# Patient Record
Sex: MALE | Race: WHITE
[De-identification: names, ages, dates, MRNs, and addresses within clinical notes are randomized per-mention and may not be internally consistent; named-entity substitution may affect disease eponyms.]

---

## 2019-09-06 NOTE — NUR
PT ARRIVES TO ED WITH C/O OF RECTAL PAIN FIR A FEW MONTHS. HOWEVER IN THE PAST 
WEEK HE NOTICED IT WAS BLEEDING MORE AND THE PAIN GOT MORE SEVERE. PT DENIES 
ANY TRUAMA TO ANUS. 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 PT HAS NO COMPLEX MEDICAL 
HX. PT HAS NOT SEEN GI YET. PT CONNECTED TO MONITORS AND CALLLIGHT IN REACH 
AWAITINGFURTHER ORDERS. VSS

## 2019-09-06 NOTE — NUR
GO LYTELY STARTED AND PT GIVEN INSTRUCTIONS ON HOW FAST TO DRINK AND WHEN WE 
CAN STOP. PT VERBALZIED UNDERSTANDING. VSS

## 2020-07-31 ENCOUNTER — HOSPITAL ENCOUNTER (OUTPATIENT)
Dept: HOSPITAL 8 - RAD | Age: 50
Discharge: HOME | End: 2020-07-31
Attending: SURGERY
Payer: COMMERCIAL

## 2020-07-31 DIAGNOSIS — K57.30: Primary | ICD-10-CM

## 2020-07-31 DIAGNOSIS — Z93.2: ICD-10-CM

## 2020-07-31 PROCEDURE — 74270 X-RAY XM COLON 1CNTRST STD: CPT

## 2020-08-07 ENCOUNTER — HOSPITAL ENCOUNTER (OUTPATIENT)
Dept: HOSPITAL 8 - STAR | Age: 50
Discharge: HOME | End: 2020-08-07
Attending: SURGERY
Payer: COMMERCIAL

## 2020-08-07 DIAGNOSIS — Z11.59: ICD-10-CM

## 2020-08-07 DIAGNOSIS — Z01.818: Primary | ICD-10-CM

## 2020-08-07 PROCEDURE — 36415 COLL VENOUS BLD VENIPUNCTURE: CPT

## 2020-08-07 PROCEDURE — 87635 SARS-COV-2 COVID-19 AMP PRB: CPT

## 2020-08-12 ENCOUNTER — HOSPITAL ENCOUNTER (INPATIENT)
Dept: HOSPITAL 8 - ORIP | Age: 50
LOS: 2 days | Discharge: HOME | DRG: 331 | End: 2020-08-14
Attending: SURGERY | Admitting: SURGERY
Payer: COMMERCIAL

## 2020-08-12 VITALS — SYSTOLIC BLOOD PRESSURE: 104 MMHG | DIASTOLIC BLOOD PRESSURE: 65 MMHG

## 2020-08-12 VITALS — SYSTOLIC BLOOD PRESSURE: 86 MMHG | DIASTOLIC BLOOD PRESSURE: 55 MMHG

## 2020-08-12 VITALS — HEIGHT: 68 IN | WEIGHT: 149.91 LBS | BODY MASS INDEX: 22.72 KG/M2

## 2020-08-12 VITALS — SYSTOLIC BLOOD PRESSURE: 86 MMHG | DIASTOLIC BLOOD PRESSURE: 54 MMHG

## 2020-08-12 VITALS — SYSTOLIC BLOOD PRESSURE: 99 MMHG | DIASTOLIC BLOOD PRESSURE: 62 MMHG

## 2020-08-12 DIAGNOSIS — R33.9: ICD-10-CM

## 2020-08-12 DIAGNOSIS — Z43.2: Primary | ICD-10-CM

## 2020-08-12 DIAGNOSIS — Z88.5: ICD-10-CM

## 2020-08-12 DIAGNOSIS — Z85.048: ICD-10-CM

## 2020-08-12 DIAGNOSIS — F31.9: ICD-10-CM

## 2020-08-12 DIAGNOSIS — G89.29: ICD-10-CM

## 2020-08-12 PROCEDURE — 0DBB0ZZ EXCISION OF ILEUM, OPEN APPROACH: ICD-10-PCS | Performed by: SURGERY

## 2020-08-12 PROCEDURE — 88304 TISSUE EXAM BY PATHOLOGIST: CPT

## 2020-08-12 PROCEDURE — 85025 COMPLETE CBC W/AUTO DIFF WBC: CPT

## 2020-08-12 PROCEDURE — 80048 BASIC METABOLIC PNL TOTAL CA: CPT

## 2020-08-12 PROCEDURE — 02PYX3Z REMOVAL OF INFUSION DEVICE FROM GREAT VESSEL, EXTERNAL APPROACH: ICD-10-PCS | Performed by: SURGERY

## 2020-08-12 PROCEDURE — 36415 COLL VENOUS BLD VENIPUNCTURE: CPT

## 2020-08-12 RX ADMIN — HYDROMORPHONE HYDROCHLORIDE PRN MG: 2 INJECTION INTRAMUSCULAR; INTRAVENOUS; SUBCUTANEOUS at 11:55

## 2020-08-12 RX ADMIN — FENTANYL CITRATE PRN MCG: 50 INJECTION INTRAMUSCULAR; INTRAVENOUS at 11:11

## 2020-08-12 RX ADMIN — FENTANYL CITRATE PRN MCG: 50 INJECTION INTRAMUSCULAR; INTRAVENOUS at 10:47

## 2020-08-12 RX ADMIN — HYDROMORPHONE HYDROCHLORIDE PRN MG: 2 INJECTION INTRAMUSCULAR; INTRAVENOUS; SUBCUTANEOUS at 11:27

## 2020-08-12 RX ADMIN — OXYCODONE HYDROCHLORIDE SCH MG: 30 TABLET ORAL at 19:46

## 2020-08-12 RX ADMIN — OXYCODONE HYDROCHLORIDE SCH MG: 30 TABLET ORAL at 14:00

## 2020-08-12 RX ADMIN — HYDROMORPHONE HYDROCHLORIDE PRN MG: 2 INJECTION INTRAMUSCULAR; INTRAVENOUS; SUBCUTANEOUS at 11:17

## 2020-08-12 RX ADMIN — TAMSULOSIN HYDROCHLORIDE SCH MG: 0.4 CAPSULE ORAL at 21:13

## 2020-08-12 RX ADMIN — FENTANYL CITRATE PRN MCG: 50 INJECTION INTRAMUSCULAR; INTRAVENOUS at 10:53

## 2020-08-12 RX ADMIN — OXYCODONE HYDROCHLORIDE SCH MG: 30 TABLET ORAL at 23:35

## 2020-08-12 RX ADMIN — DEXTROSE, SODIUM CHLORIDE, AND POTASSIUM CHLORIDE SCH MLS/HR: 5; .45; .15 INJECTION INTRAVENOUS at 13:19

## 2020-08-12 RX ADMIN — FENTANYL CITRATE PRN MCG: 50 INJECTION INTRAMUSCULAR; INTRAVENOUS at 11:05

## 2020-08-12 RX ADMIN — ACETAMINOPHEN SCH MG: 500 TABLET, COATED ORAL at 18:15

## 2020-08-12 RX ADMIN — OXYCODONE HYDROCHLORIDE SCH MG: 30 TABLET ORAL at 15:27

## 2020-08-12 RX ADMIN — HYDROMORPHONE HYDROCHLORIDE PRN MG: 2 INJECTION INTRAMUSCULAR; INTRAVENOUS; SUBCUTANEOUS at 11:39

## 2020-08-12 RX ADMIN — DIVALPROEX SODIUM SCH MG: 500 TABLET, EXTENDED RELEASE ORAL at 21:13

## 2020-08-13 VITALS — SYSTOLIC BLOOD PRESSURE: 111 MMHG | DIASTOLIC BLOOD PRESSURE: 72 MMHG

## 2020-08-13 VITALS — DIASTOLIC BLOOD PRESSURE: 58 MMHG | SYSTOLIC BLOOD PRESSURE: 96 MMHG

## 2020-08-13 VITALS — DIASTOLIC BLOOD PRESSURE: 71 MMHG | SYSTOLIC BLOOD PRESSURE: 104 MMHG

## 2020-08-13 VITALS — SYSTOLIC BLOOD PRESSURE: 98 MMHG | DIASTOLIC BLOOD PRESSURE: 61 MMHG

## 2020-08-13 VITALS — DIASTOLIC BLOOD PRESSURE: 59 MMHG | SYSTOLIC BLOOD PRESSURE: 97 MMHG

## 2020-08-13 LAB
ANION GAP SERPL CALC-SCNC: 8 MMOL/L (ref 5–15)
BASOPHILS # BLD AUTO: 0.03 X10^3/UL (ref 0–0.1)
BASOPHILS NFR BLD AUTO: 0 % (ref 0–1)
CALCIUM SERPL-MCNC: 8.2 MG/DL (ref 8.5–10.1)
CHLORIDE SERPL-SCNC: 108 MMOL/L (ref 98–107)
CREAT SERPL-MCNC: 1.09 MG/DL (ref 0.7–1.3)
EOSINOPHIL # BLD AUTO: 0.27 X10^3/UL (ref 0–0.4)
EOSINOPHIL NFR BLD AUTO: 3 % (ref 1–7)
ERYTHROCYTE [DISTWIDTH] IN BLOOD BY AUTOMATED COUNT: 14.8 % (ref 9.4–14.8)
LYMPHOCYTES # BLD AUTO: 0.66 X10^3/UL (ref 1–3.4)
LYMPHOCYTES NFR BLD AUTO: 7 % (ref 22–44)
MCH RBC QN AUTO: 34.1 PG (ref 27.5–34.5)
MCHC RBC AUTO-ENTMCNC: 32.8 G/DL (ref 33.2–36.2)
MCV RBC AUTO: 103.9 FL (ref 81–97)
MD: NO
MONOCYTES # BLD AUTO: 0.68 X10^3/UL (ref 0.2–0.8)
MONOCYTES NFR BLD AUTO: 7 % (ref 2–9)
NEUTROPHILS # BLD AUTO: 7.75 X10^3/UL (ref 1.8–6.8)
NEUTROPHILS NFR BLD AUTO: 83 % (ref 42–75)
PLATELET # BLD AUTO: 297 X10^3/UL (ref 130–400)
PMV BLD AUTO: 7.5 FL (ref 7.4–10.4)
RBC # BLD AUTO: 3.66 X10^6/UL (ref 4.38–5.82)

## 2020-08-13 RX ADMIN — OXYCODONE HYDROCHLORIDE SCH MG: 30 TABLET ORAL at 11:28

## 2020-08-13 RX ADMIN — OXYCODONE HYDROCHLORIDE SCH MG: 30 TABLET ORAL at 19:24

## 2020-08-13 RX ADMIN — TAMSULOSIN HYDROCHLORIDE SCH MG: 0.4 CAPSULE ORAL at 07:33

## 2020-08-13 RX ADMIN — ACETAMINOPHEN SCH MG: 500 TABLET, COATED ORAL at 00:00

## 2020-08-13 RX ADMIN — OXYCODONE HYDROCHLORIDE SCH MG: 30 TABLET ORAL at 15:55

## 2020-08-13 RX ADMIN — OXYCODONE HYDROCHLORIDE SCH MG: 30 TABLET ORAL at 23:37

## 2020-08-13 RX ADMIN — ACETAMINOPHEN SCH MG: 500 TABLET, COATED ORAL at 05:55

## 2020-08-13 RX ADMIN — TAMSULOSIN HYDROCHLORIDE SCH MG: 0.4 CAPSULE ORAL at 21:01

## 2020-08-13 RX ADMIN — OXYCODONE HYDROCHLORIDE SCH MG: 30 TABLET ORAL at 03:37

## 2020-08-13 RX ADMIN — DEXTROSE, SODIUM CHLORIDE, AND POTASSIUM CHLORIDE SCH MLS/HR: 5; .45; .15 INJECTION INTRAVENOUS at 03:18

## 2020-08-13 RX ADMIN — DEXTROSE, SODIUM CHLORIDE, AND POTASSIUM CHLORIDE SCH MLS/HR: 5; .45; .15 INJECTION INTRAVENOUS at 17:07

## 2020-08-13 RX ADMIN — QUETIAPINE FUMARATE SCH MG: 100 TABLET ORAL at 21:01

## 2020-08-13 RX ADMIN — ENOXAPARIN SODIUM SCH MG: 40 INJECTION SUBCUTANEOUS at 08:45

## 2020-08-13 RX ADMIN — ACETAMINOPHEN SCH MG: 500 TABLET, COATED ORAL at 11:32

## 2020-08-13 RX ADMIN — ACETAMINOPHEN SCH MG: 500 TABLET, COATED ORAL at 19:25

## 2020-08-13 RX ADMIN — DIVALPROEX SODIUM SCH MG: 500 TABLET, EXTENDED RELEASE ORAL at 21:01

## 2020-08-13 RX ADMIN — OXYCODONE HYDROCHLORIDE SCH MG: 30 TABLET ORAL at 07:33

## 2020-08-14 VITALS — DIASTOLIC BLOOD PRESSURE: 66 MMHG | SYSTOLIC BLOOD PRESSURE: 100 MMHG

## 2020-08-14 LAB
ANION GAP SERPL CALC-SCNC: 6 MMOL/L (ref 5–15)
BASOPHILS # BLD AUTO: 0.01 X10^3/UL (ref 0–0.1)
BASOPHILS NFR BLD AUTO: 0 % (ref 0–1)
CALCIUM SERPL-MCNC: 8.2 MG/DL (ref 8.5–10.1)
CHLORIDE SERPL-SCNC: 108 MMOL/L (ref 98–107)
CREAT SERPL-MCNC: 0.93 MG/DL (ref 0.7–1.3)
EOSINOPHIL # BLD AUTO: 0.48 X10^3/UL (ref 0–0.4)
EOSINOPHIL NFR BLD AUTO: 7 % (ref 1–7)
ERYTHROCYTE [DISTWIDTH] IN BLOOD BY AUTOMATED COUNT: 14.9 % (ref 9.4–14.8)
LYMPHOCYTES # BLD AUTO: 1.05 X10^3/UL (ref 1–3.4)
LYMPHOCYTES NFR BLD AUTO: 15 % (ref 22–44)
MCH RBC QN AUTO: 34.3 PG (ref 27.5–34.5)
MCHC RBC AUTO-ENTMCNC: 33.3 G/DL (ref 33.2–36.2)
MCV RBC AUTO: 103 FL (ref 81–97)
MD: NO
MONOCYTES # BLD AUTO: 0.67 X10^3/UL (ref 0.2–0.8)
MONOCYTES NFR BLD AUTO: 9 % (ref 2–9)
NEUTROPHILS # BLD AUTO: 4.91 X10^3/UL (ref 1.8–6.8)
NEUTROPHILS NFR BLD AUTO: 69 % (ref 42–75)
PLATELET # BLD AUTO: 256 X10^3/UL (ref 130–400)
PMV BLD AUTO: 7.9 FL (ref 7.4–10.4)
RBC # BLD AUTO: 3.69 X10^6/UL (ref 4.38–5.82)

## 2020-08-14 RX ADMIN — OXYCODONE HYDROCHLORIDE SCH MG: 30 TABLET ORAL at 08:00

## 2020-08-14 RX ADMIN — ENOXAPARIN SODIUM SCH MG: 40 INJECTION SUBCUTANEOUS at 09:11

## 2020-08-14 RX ADMIN — ACETAMINOPHEN SCH MG: 500 TABLET, COATED ORAL at 01:50

## 2020-08-14 RX ADMIN — ACETAMINOPHEN SCH MG: 500 TABLET, COATED ORAL at 08:00

## 2020-08-14 RX ADMIN — OXYCODONE HYDROCHLORIDE SCH MG: 30 TABLET ORAL at 03:27

## 2020-08-14 RX ADMIN — QUETIAPINE FUMARATE SCH MG: 100 TABLET ORAL at 09:11

## 2020-08-14 RX ADMIN — TAMSULOSIN HYDROCHLORIDE SCH MG: 0.4 CAPSULE ORAL at 08:01

## 2020-08-14 RX ADMIN — DEXTROSE, SODIUM CHLORIDE, AND POTASSIUM CHLORIDE SCH MLS/HR: 5; .45; .15 INJECTION INTRAVENOUS at 07:54

## 2020-08-19 ENCOUNTER — HOSPITAL ENCOUNTER (OUTPATIENT)
Dept: HOSPITAL 8 - LAB | Age: 50
Discharge: HOME | End: 2020-08-19
Attending: SURGERY
Payer: COMMERCIAL

## 2020-08-19 DIAGNOSIS — R19.7: Primary | ICD-10-CM

## 2020-08-19 LAB
ANION GAP SERPL CALC-SCNC: 7 MMOL/L (ref 5–15)
BASOPHILS # BLD AUTO: 0.03 X10^3/UL (ref 0–0.1)
BASOPHILS NFR BLD AUTO: 1 % (ref 0–1)
CALCIUM SERPL-MCNC: 9 MG/DL (ref 8.5–10.1)
CHLORIDE SERPL-SCNC: 107 MMOL/L (ref 98–107)
CLOSTRIDIUM DIFFICILE ANTIGEN: NEGATIVE
CLOSTRIDIUM DIFFICILE TOXIN: NEGATIVE
CREAT SERPL-MCNC: 0.94 MG/DL (ref 0.7–1.3)
EOSINOPHIL # BLD AUTO: 0.15 X10^3/UL (ref 0–0.4)
EOSINOPHIL NFR BLD AUTO: 2 % (ref 1–7)
ERYTHROCYTE [DISTWIDTH] IN BLOOD BY AUTOMATED COUNT: 14 % (ref 9.4–14.8)
LYMPHOCYTES # BLD AUTO: 1.78 X10^3/UL (ref 1–3.4)
LYMPHOCYTES NFR BLD AUTO: 24 % (ref 22–44)
MCH RBC QN AUTO: 34.1 PG (ref 27.5–34.5)
MCHC RBC AUTO-ENTMCNC: 33.3 G/DL (ref 33.2–36.2)
MCV RBC AUTO: 102.4 FL (ref 81–97)
MD: (no result)
MONOCYTES # BLD AUTO: 0.74 X10^3/UL (ref 0.2–0.8)
MONOCYTES NFR BLD AUTO: 10 % (ref 2–9)
NEUTROPHILS # BLD AUTO: 4.63 X10^3/UL (ref 1.8–6.8)
NEUTROPHILS NFR BLD AUTO: 63 % (ref 42–75)
PLATELET # BLD AUTO: 444 X10^3/UL (ref 130–400)
PMV BLD AUTO: 7.3 FL (ref 7.4–10.4)
RBC # BLD AUTO: 4.33 X10^6/UL (ref 4.38–5.82)

## 2020-08-19 PROCEDURE — 85025 COMPLETE CBC W/AUTO DIFF WBC: CPT

## 2020-08-19 PROCEDURE — 87324 CLOSTRIDIUM AG IA: CPT

## 2020-08-19 PROCEDURE — 80048 BASIC METABOLIC PNL TOTAL CA: CPT

## 2020-08-19 PROCEDURE — 36415 COLL VENOUS BLD VENIPUNCTURE: CPT

## 2020-08-27 ENCOUNTER — HOSPITAL ENCOUNTER (EMERGENCY)
Dept: HOSPITAL 8 - ED | Age: 50
Discharge: HOME | End: 2020-08-27
Payer: COMMERCIAL

## 2020-08-27 VITALS — HEIGHT: 67 IN | BODY MASS INDEX: 24.91 KG/M2 | WEIGHT: 158.73 LBS

## 2020-08-27 VITALS — SYSTOLIC BLOOD PRESSURE: 101 MMHG | DIASTOLIC BLOOD PRESSURE: 66 MMHG

## 2020-08-27 DIAGNOSIS — Z85.040: ICD-10-CM

## 2020-08-27 DIAGNOSIS — R33.9: ICD-10-CM

## 2020-08-27 DIAGNOSIS — F17.210: ICD-10-CM

## 2020-08-27 DIAGNOSIS — N40.0: Primary | ICD-10-CM

## 2020-08-27 PROCEDURE — 99406 BEHAV CHNG SMOKING 3-10 MIN: CPT

## 2020-08-27 PROCEDURE — 99284 EMERGENCY DEPT VISIT MOD MDM: CPT
